# Patient Record
Sex: MALE | URBAN - METROPOLITAN AREA
[De-identification: names, ages, dates, MRNs, and addresses within clinical notes are randomized per-mention and may not be internally consistent; named-entity substitution may affect disease eponyms.]

---

## 2024-07-27 ENCOUNTER — CLAIMS CREATED FROM THE CLAIM WINDOW (OUTPATIENT)
Dept: URBAN - METROPOLITAN AREA MEDICAL CENTER 27 | Facility: MEDICAL CENTER | Age: 79
End: 2024-07-27

## 2024-07-27 PROCEDURE — 44360 SMALL BOWEL ENDOSCOPY: CPT | Performed by: INTERNAL MEDICINE

## 2024-07-27 PROCEDURE — 99255 IP/OBS CONSLTJ NEW/EST HI 80: CPT | Performed by: INTERNAL MEDICINE

## 2024-07-27 PROCEDURE — 43235 EGD DIAGNOSTIC BRUSH WASH: CPT | Performed by: INTERNAL MEDICINE

## 2024-07-28 ENCOUNTER — CLAIMS CREATED FROM THE CLAIM WINDOW (OUTPATIENT)
Dept: URBAN - METROPOLITAN AREA MEDICAL CENTER 27 | Facility: MEDICAL CENTER | Age: 79
End: 2024-07-28

## 2024-07-28 PROCEDURE — 99233 SBSQ HOSP IP/OBS HIGH 50: CPT | Performed by: INTERNAL MEDICINE

## 2024-07-29 ENCOUNTER — CLAIMS CREATED FROM THE CLAIM WINDOW (OUTPATIENT)
Dept: URBAN - METROPOLITAN AREA MEDICAL CENTER 27 | Facility: MEDICAL CENTER | Age: 79
End: 2024-07-29

## 2024-07-29 PROCEDURE — 99232 SBSQ HOSP IP/OBS MODERATE 35: CPT | Performed by: INTERNAL MEDICINE

## 2024-07-30 ENCOUNTER — CLAIMS CREATED FROM THE CLAIM WINDOW (OUTPATIENT)
Dept: URBAN - METROPOLITAN AREA MEDICAL CENTER 27 | Facility: MEDICAL CENTER | Age: 79
End: 2024-07-30

## 2024-07-30 PROCEDURE — 99232 SBSQ HOSP IP/OBS MODERATE 35: CPT | Performed by: INTERNAL MEDICINE

## 2024-08-01 ENCOUNTER — CLAIMS CREATED FROM THE CLAIM WINDOW (OUTPATIENT)
Dept: URBAN - METROPOLITAN AREA MEDICAL CENTER 27 | Facility: MEDICAL CENTER | Age: 79
End: 2024-08-01
Payer: MEDICARE

## 2024-08-01 DIAGNOSIS — D50.9 ANEMIA: ICD-10-CM

## 2024-08-01 DIAGNOSIS — K26.9 CHILDHOOD DUODENAL ULCER: ICD-10-CM

## 2024-08-01 DIAGNOSIS — K92.1 ACUTE MELENA: ICD-10-CM

## 2024-08-01 PROCEDURE — 43235 EGD DIAGNOSTIC BRUSH WASH: CPT | Performed by: INTERNAL MEDICINE

## 2024-08-02 ENCOUNTER — CLAIMS CREATED FROM THE CLAIM WINDOW (OUTPATIENT)
Dept: URBAN - METROPOLITAN AREA MEDICAL CENTER 27 | Facility: MEDICAL CENTER | Age: 79
End: 2024-08-02
Payer: MEDICARE

## 2024-08-02 DIAGNOSIS — D62 ABLA (ACUTE BLOOD LOSS ANEMIA): ICD-10-CM

## 2024-08-02 DIAGNOSIS — K26.9 CHILDHOOD DUODENAL ULCER: ICD-10-CM

## 2024-08-02 DIAGNOSIS — K92.1 ACUTE MELENA: ICD-10-CM

## 2024-08-02 PROCEDURE — 99232 SBSQ HOSP IP/OBS MODERATE 35: CPT | Performed by: NURSE PRACTITIONER

## 2024-08-03 ENCOUNTER — CLAIMS CREATED FROM THE CLAIM WINDOW (OUTPATIENT)
Dept: URBAN - METROPOLITAN AREA MEDICAL CENTER 27 | Facility: MEDICAL CENTER | Age: 79
End: 2024-08-03

## 2024-08-03 PROCEDURE — 99232 SBSQ HOSP IP/OBS MODERATE 35: CPT | Performed by: STUDENT IN AN ORGANIZED HEALTH CARE EDUCATION/TRAINING PROGRAM

## 2024-08-20 ENCOUNTER — OFFICE VISIT (OUTPATIENT)
Dept: URBAN - METROPOLITAN AREA CLINIC 23 | Facility: CLINIC | Age: 79
End: 2024-08-20
Payer: MEDICARE

## 2024-08-20 ENCOUNTER — DASHBOARD ENCOUNTERS (OUTPATIENT)
Age: 79
End: 2024-08-20

## 2024-08-20 ENCOUNTER — TELEPHONE ENCOUNTER (OUTPATIENT)
Dept: URBAN - METROPOLITAN AREA CLINIC 5 | Facility: CLINIC | Age: 79
End: 2024-08-20

## 2024-08-20 VITALS
TEMPERATURE: 97.5 F | WEIGHT: 197 LBS | HEART RATE: 106 BPM | SYSTOLIC BLOOD PRESSURE: 126 MMHG | HEIGHT: 71 IN | BODY MASS INDEX: 27.58 KG/M2 | DIASTOLIC BLOOD PRESSURE: 77 MMHG

## 2024-08-20 DIAGNOSIS — K26.9 DUODENAL ULCER: ICD-10-CM

## 2024-08-20 DIAGNOSIS — K92.1 MELENA: ICD-10-CM

## 2024-08-20 PROCEDURE — 99214 OFFICE O/P EST MOD 30 MIN: CPT | Performed by: STUDENT IN AN ORGANIZED HEALTH CARE EDUCATION/TRAINING PROGRAM

## 2024-08-20 NOTE — HPI-TODAY'S VISIT:
Mr. Adorno is a 79-year-old male who presents as a hospital follow-up for evaluation of bleeding duodenal ulcers.  He presented to [facility] 7/26/2024 for confusion and urinary tract infection.  While admitted, he had a maroon-colored stool and a hemoglobin drop from 14-8.  GI was consulted and he had endoscopies 7/27 and 8/1.  7/27 EGD with large amounts of dark blood and large duodenal ulcers.  8/20 EGD showed large duodenal ulcer with visible vessel treated with bipolar cautery.  He had stabilization of hemoglobin, resolution of melena, was discharged home.  Biopsies negative for H. pylori, only NSAIDs was chronic meloxicam use.  Patient denies tobacco use. Mr. Adorno feels fatigued but is otherwise doing okay.  He reports that his stools were returning to normal brown until this morning, when he had a single black stool.  He denies hematemesis or red blood in his stool.  He has been taking pantoprazole twice daily faithfully and has avoided NSAIDs, denies abdominal pain.

## 2024-08-21 ENCOUNTER — OFFICE VISIT (OUTPATIENT)
Dept: URBAN - METROPOLITAN AREA CLINIC 12 | Facility: CLINIC | Age: 79
End: 2024-08-21

## 2024-08-25 LAB
ABSOLUTE BASOPHILS: 9
ABSOLUTE EOSINOPHILS: 9
ABSOLUTE LYMPHOCYTES: 711
ABSOLUTE MONOCYTES: 270
ABSOLUTE NEUTROPHILS: 3501
BASOPHILS: 0.2
EOSINOPHILS: 0.2
FERRITIN, SERUM: 251
HEMATOCRIT: 34.9
HEMOGLOBIN: 11.2
IRON BIND.CAP.(TIBC): 170
IRON SATURATION: 22
IRON: 37
LYMPHOCYTES: 15.8
MCH: 32.1
MCHC: 32.1
MCV: 100
MONOCYTES: 6
MPV: 9.5
NEUTROPHILS: 77.8
PLATELET COUNT: 220
RDW: 15.5
RED BLOOD CELL COUNT: 3.49
WHITE BLOOD CELL COUNT: 4.5

## 2024-09-23 ENCOUNTER — OFFICE VISIT (OUTPATIENT)
Dept: URBAN - METROPOLITAN AREA CLINIC 12 | Facility: CLINIC | Age: 79
End: 2024-09-23

## 2024-09-24 ENCOUNTER — OFFICE VISIT (OUTPATIENT)
Dept: URBAN - METROPOLITAN AREA CLINIC 23 | Facility: CLINIC | Age: 79
End: 2024-09-24
Payer: MEDICARE

## 2024-09-24 VITALS
TEMPERATURE: 97.5 F | SYSTOLIC BLOOD PRESSURE: 120 MMHG | WEIGHT: 183 LBS | HEIGHT: 71 IN | DIASTOLIC BLOOD PRESSURE: 76 MMHG | BODY MASS INDEX: 25.62 KG/M2 | HEART RATE: 116 BPM

## 2024-09-24 DIAGNOSIS — K26.9 DUODENAL ULCER: ICD-10-CM

## 2024-09-24 PROCEDURE — 99212 OFFICE O/P EST SF 10 MIN: CPT | Performed by: STUDENT IN AN ORGANIZED HEALTH CARE EDUCATION/TRAINING PROGRAM

## 2024-09-24 RX ORDER — PANTOPRAZOLE SODIUM 20 MG/1
1 TABLET TABLET, DELAYED RELEASE ORAL ONCE A DAY
Qty: 90 TABLET | Refills: 3 | OUTPATIENT
Start: 2024-09-24

## 2024-09-24 NOTE — HPI-MIGRATED HPI
Mr. Adorno is a 79-year-old male who presents as a hospital follow-up for evaluation of bleeding duodenal ulcers. He presented to [facility] 7/26/2024 for confusion and urinary tract infection. While admitted, he had a maroon-colored stool and a hemoglobin drop from 14-8. GI was consulted and he had endoscopies 7/27 and 8/1. 7/27 EGD with large amounts of dark blood and large duodenal ulcers. 8/20 EGD showed large duodenal ulcer with visible vessel treated with bipolar cautery. He had stabilization of hemoglobin, resolution of melena, was discharged home. Biopsies negative for H. pylori, only NSAIDs was chronic meloxicam use. Patient denies tobacco use. Mr. Adorno feels fatigued but is otherwise doing okay. He reports that his stools were returning to normal brown until this morning, when he had a single black stool. He denies hematemesis or red blood in his stool. He has been taking pantoprazole twice daily faithfully and has avoided NSAIDs, denies abdominal pain.

## 2024-09-24 NOTE — HPI-TODAY'S VISIT:
- Follow-up visit for previous gastrointestinal issues - No more black stools or blood in stool reported - Stool color now described as "more brown" - No issues with eating reported - Significant weight loss noted, not intentional - Patient reports feeling less hungry - No nausea or vomiting reported - Last colonoscopy estimated to be about 4 years ago

## 2025-05-28 ENCOUNTER — CLAIMS CREATED FROM THE CLAIM WINDOW (OUTPATIENT)
Dept: URBAN - METROPOLITAN AREA MEDICAL CENTER 27 | Facility: MEDICAL CENTER | Age: 80
End: 2025-05-28

## 2025-05-28 PROCEDURE — 99254 IP/OBS CNSLTJ NEW/EST MOD 60: CPT | Performed by: INTERNAL MEDICINE

## 2025-05-29 ENCOUNTER — LAB OUTSIDE AN ENCOUNTER (OUTPATIENT)
Dept: URBAN - METROPOLITAN AREA CLINIC 23 | Facility: CLINIC | Age: 80
End: 2025-05-29

## 2025-05-30 ENCOUNTER — CLAIMS CREATED FROM THE CLAIM WINDOW (OUTPATIENT)
Dept: URBAN - METROPOLITAN AREA MEDICAL CENTER 27 | Facility: MEDICAL CENTER | Age: 80
End: 2025-05-30

## 2025-05-30 PROCEDURE — 99232 SBSQ HOSP IP/OBS MODERATE 35: CPT | Performed by: STUDENT IN AN ORGANIZED HEALTH CARE EDUCATION/TRAINING PROGRAM

## 2025-06-02 ENCOUNTER — CLAIMS CREATED FROM THE CLAIM WINDOW (OUTPATIENT)
Dept: URBAN - METROPOLITAN AREA MEDICAL CENTER 27 | Facility: MEDICAL CENTER | Age: 80
End: 2025-06-02

## 2025-06-02 PROCEDURE — 99232 SBSQ HOSP IP/OBS MODERATE 35: CPT | Performed by: NURSE PRACTITIONER

## 2025-06-03 ENCOUNTER — LAB OUTSIDE AN ENCOUNTER (OUTPATIENT)
Dept: URBAN - METROPOLITAN AREA CLINIC 23 | Facility: CLINIC | Age: 80
End: 2025-06-03

## 2025-06-03 ENCOUNTER — CLAIMS CREATED FROM THE CLAIM WINDOW (OUTPATIENT)
Dept: URBAN - METROPOLITAN AREA MEDICAL CENTER 27 | Facility: MEDICAL CENTER | Age: 80
End: 2025-06-03
Payer: MEDICARE

## 2025-06-03 DIAGNOSIS — D13.2 ADENOMA OF DUODENUM: ICD-10-CM

## 2025-06-03 DIAGNOSIS — K80.50 BILE DUCT CALCULUS: ICD-10-CM

## 2025-06-03 PROCEDURE — 43261 ENDO CHOLANGIOPANCREATOGRAPH: CPT | Performed by: INTERNAL MEDICINE

## 2025-06-03 PROCEDURE — ERCSB ERCP WITH SNARE BIOPSY: Performed by: INTERNAL MEDICINE

## 2025-06-03 PROCEDURE — 43235 EGD DIAGNOSTIC BRUSH WASH: CPT | Performed by: INTERNAL MEDICINE

## 2025-06-03 PROCEDURE — 74328 X-RAY BILE DUCT ENDOSCOPY: CPT | Performed by: INTERNAL MEDICINE

## 2025-06-03 PROCEDURE — 43264 ERCP REMOVE DUCT CALCULI: CPT | Performed by: INTERNAL MEDICINE

## 2025-06-03 PROCEDURE — 43262 ENDO CHOLANGIOPANCREATOGRAPH: CPT | Performed by: INTERNAL MEDICINE

## 2025-06-04 ENCOUNTER — CLAIMS CREATED FROM THE CLAIM WINDOW (OUTPATIENT)
Dept: URBAN - METROPOLITAN AREA MEDICAL CENTER 27 | Facility: MEDICAL CENTER | Age: 80
End: 2025-06-04

## 2025-06-04 PROCEDURE — 99232 SBSQ HOSP IP/OBS MODERATE 35: CPT | Performed by: NURSE PRACTITIONER
